# Patient Record
Sex: MALE | Race: OTHER | NOT HISPANIC OR LATINO | URBAN - METROPOLITAN AREA
[De-identification: names, ages, dates, MRNs, and addresses within clinical notes are randomized per-mention and may not be internally consistent; named-entity substitution may affect disease eponyms.]

---
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
## 2021-04-10 ENCOUNTER — EMERGENCY (EMERGENCY)
Facility: HOSPITAL | Age: 21
LOS: 1 days | Discharge: HOME | End: 2021-04-10
Attending: STUDENT IN AN ORGANIZED HEALTH CARE EDUCATION/TRAINING PROGRAM
Payer: COMMERCIAL

## 2021-04-10 VITALS
DIASTOLIC BLOOD PRESSURE: 80 MMHG | OXYGEN SATURATION: 99 % | HEART RATE: 87 BPM | RESPIRATION RATE: 16 BRPM | TEMPERATURE: 99 F | HEIGHT: 68 IN | WEIGHT: 180.78 LBS | SYSTOLIC BLOOD PRESSURE: 124 MMHG

## 2021-04-10 PROCEDURE — 99284 EMERGENCY DEPT VISIT MOD MDM: CPT | Mod: 25

## 2021-04-10 PROCEDURE — 99283 EMERGENCY DEPT VISIT LOW MDM: CPT

## 2021-04-10 PROCEDURE — 16020 DRESS/DEBRID P-THICK BURN S: CPT

## 2021-04-10 RX ORDER — MUPIROCIN 20 MG/G
1 OINTMENT TOPICAL
Qty: 1 | Refills: 0
Start: 2021-04-10 | End: 2021-04-19

## 2021-04-10 RX ORDER — IBUPROFEN 200 MG
600 TABLET ORAL ONCE
Refills: 0 | Status: DISCONTINUED | OUTPATIENT
Start: 2021-04-10 | End: 2021-04-10

## 2021-04-10 NOTE — ED PROVIDER NOTE - SKIN, MLM
Large area of [id number] erythema and blisters that are filled with serous sanguinous fluid which are tender. Blisters are intact.
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
## 2021-04-10 NOTE — ED PROVIDER NOTE - PROGRESS NOTE DETAILS
Will FU with [facility]. Pt is well appearing walking with steady gait, stable for discharge and follow up without fail with medical doctor. I had a detailed discussion with the patient and/or guardian regarding the historical points, exam findings, and any diagnostic results supporting the discharge diagnosis. Pt educated on care and need for follow up. Strict return instructions and red flag signs and symptoms discussed with patient. Questions answered. Pt shows understanding of discharge information and agrees to follow.

## 2021-04-10 NOTE — ED PROVIDER NOTE - PATIENT PORTAL LINK FT
You can access the FollowMyHealth Patient Portal offered by [facility] by registering at the following website: [email or web address]. By joining [facility]’s FollowMyHealth portal, you will also be able to view your health information using other applications (apps) compatible with our system.
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
## 2023-04-20 PROBLEM — Z00.00 ENCOUNTER FOR PREVENTIVE HEALTH EXAMINATION: Status: ACTIVE | Noted: 2023-04-20

## 2023-05-22 ENCOUNTER — NON-APPOINTMENT (OUTPATIENT)
Age: 23
End: 2023-05-22

## 2023-05-23 ENCOUNTER — APPOINTMENT (OUTPATIENT)
Dept: UROLOGY | Facility: CLINIC | Age: 23
End: 2023-05-23
Payer: MEDICAID

## 2023-05-23 ENCOUNTER — NON-APPOINTMENT (OUTPATIENT)
Age: 23
End: 2023-05-23

## 2023-05-23 VITALS
WEIGHT: 195 LBS | BODY MASS INDEX: 29.55 KG/M2 | HEART RATE: 93 BPM | HEIGHT: 68 IN | DIASTOLIC BLOOD PRESSURE: 82 MMHG | SYSTOLIC BLOOD PRESSURE: 137 MMHG | TEMPERATURE: 98.1 F

## 2023-05-23 PROCEDURE — 93975 VASCULAR STUDY: CPT

## 2023-05-23 PROCEDURE — 99204 OFFICE O/P NEW MOD 45 MIN: CPT | Mod: 25

## 2023-05-23 NOTE — HISTORY OF PRESENT ILLNESS
[FreeTextEntry1] : 23 yo. No PMH\par Partenered to Ban Givens, 19 yo. Everything OK on her side.\par Did 1 IVF 01/2022, had a baby girl.\par Referred from [facility]\par \par Hypogonadism\par They want to try to naturally conceive. \par Was on clomid until pregnancy. \par \par SA 11/2021\par 2.5 ml/30.5 mill/ml/0.02% motility\par \par SA 10/2021\par 2 ml/ 13 mill/ml/5.5% motility \par \par Previous SA 11/2020 \par 2 ml/ 4 mill/ml/0% motility \par \par No blood workup.\par No other complaint.\par \par

## 2023-05-23 NOTE — ASSESSMENT
[FreeTextEntry1] : Hypogonadism\par TT FSH LH E2 \par Sonogram no varicocele noted\par reports previous robust TT response to clomid and required decreasing dosing\par results by phone\par if low TT start clomid 25 qod\par labs 4 weeks later and in person 6 weeks later\par

## 2023-05-23 NOTE — LETTER BODY
[Dear  ___] : Dear  [unfilled], [FreeTextEntry2] : Luigi Quintero MD\par [facility]\par [address], [address]\[place] [address] [FreeTextEntry1] : I had the pleasure of seeing MAR MARCH, a 22 year old man,  to your patient Ban Givens, in the office in consultation today. Please see the attached note for full details.\par \par Thank you very much for allowing me to participate in the care of this patient. If you have any questions please feel free to call me at any time. \par \par \par Sincerely yours,\par \par \par \par Los Brown MD, MILDRED\par Director, Male Fertility and Microsurgery\par  of Urology\par [facility]\par

## 2023-05-23 NOTE — PHYSICAL EXAM
[Urethral Meatus] : meatus normal [Penis Abnormality] : normal circumcised penis [Scrotum] : the scrotum was normal [Epididymis] : the epididymides were normal [Testes Tenderness] : no tenderness of the testes [Testes Mass (___cm)] : there were no testicular masses [FreeTextEntry1] : Bilateral  10- 12 cc testis. Left grade I varicocele? Bilateral present vas.

## 2023-06-13 ENCOUNTER — APPOINTMENT (OUTPATIENT)
Dept: UROLOGY | Facility: CLINIC | Age: 23
End: 2023-06-13

## 2023-06-21 ENCOUNTER — NON-APPOINTMENT (OUTPATIENT)
Age: 23
End: 2023-06-21

## 2023-08-11 RX ORDER — CLOMIPHENE CITRATE 50 MG/1
50 TABLET ORAL
Qty: 30 | Refills: 3 | Status: ACTIVE | COMMUNITY
Start: 2023-06-21 | End: 1900-01-01

## 2023-08-22 ENCOUNTER — APPOINTMENT (OUTPATIENT)
Dept: UROLOGY | Facility: CLINIC | Age: 23
End: 2023-08-22
Payer: MEDICAID

## 2023-08-22 VITALS
HEIGHT: 68 IN | SYSTOLIC BLOOD PRESSURE: 134 MMHG | WEIGHT: 195 LBS | HEART RATE: 101 BPM | BODY MASS INDEX: 29.55 KG/M2 | DIASTOLIC BLOOD PRESSURE: 82 MMHG | TEMPERATURE: 97.8 F

## 2023-08-22 DIAGNOSIS — E29.1 TESTICULAR HYPOFUNCTION: ICD-10-CM

## 2023-08-22 PROCEDURE — 99214 OFFICE O/P EST MOD 30 MIN: CPT

## 2023-08-22 NOTE — PHYSICAL EXAM
[Normal Appearance] : normal appearance [General Appearance - In No Acute Distress] : no acute distress [Heart Rate And Rhythm] : Heart rate and rhythm were normal [Edema] : no peripheral edema [] : no respiratory distress [Exaggerated Use Of Accessory Muscles For Inspiration] : no accessory muscle use [Respiration, Rhythm And Depth] : normal respiratory rhythm and effort [Oriented To Time, Place, And Person] : oriented to person, place, and time [Affect] : the affect was normal [Normal Station and Gait] : the gait and station were normal for the patient's age

## 2023-08-23 NOTE — HISTORY OF PRESENT ILLNESS
[FreeTextEntry1] : 22M here for follow up for hypogonadism  - on clomid q 50 - required for previous conception with IVF, hope to attempt natural conception - feeling well - Denies mood swings, hot flashes, chest tenderness, fever, chills, flank pain

## 2023-08-23 NOTE — END OF VISIT
[FreeTextEntry3] : I, Dr. Brown, personally performed the evaluation and management (E/M) services for this new patient.  That E/M includes conducting the clinically appropriate initial history &/or exam, assessing all conditions, and establishing the plan of care.  Today, my COLTON, Wily Perez, was here to observe my evaluation and management service for this patient & follow plan of care established by me going forward.

## 2023-08-23 NOTE — ASSESSMENT
[FreeTextEntry1] : 22M here for hypogonadism  hypogonadism - on clomid 50 MG QOD - TT, E2, LH, FSH, CBC, CMP - SA - review by phone.

## 2024-03-19 NOTE — ED ADULT TRIAGE NOTE - TEMPERATURE IN FAHRENHEIT (DEGREES F)
-- DO NOT REPLY / DO NOT REPLY ALL --  -- Message is from Engagement Center Operations (ECO) --    ONLY TO BE USED WITHIN A REFILL MEDICATION ENCOUNTER    Med Refill  Is the patient currently having any symptoms?: No/Non-Emergent symptoms    Name of medication requested: See pended med    Is this the first request for the medication in the last 48 hours?: Yes    Patient is requesting a medication refill - medication is on active medication list    Patient is currently OUT of the requested medication - sent as HIGH priority - Patient is all out of medication    Full name of the provider who ordered the medication: Chas SMITH, [facility] site name / Account # for provider: [facility] [address] [facility]     Preferred Pharmacy: Pharmacy  [id number] [address] [facility]  [place]    Patient confirmed the above pharmacy as correct?  Yes    Caller Information         Type Contact Phone/Fax    03/19/2024 10:48 AM CDT Phone (Incoming) Ki Fragoso (Self) [phone] (M)            Alternative phone number: [phone] (Wife)    Can a detailed message be left?: Yes        
98.7